# Patient Record
Sex: MALE | Race: BLACK OR AFRICAN AMERICAN | URBAN - METROPOLITAN AREA
[De-identification: names, ages, dates, MRNs, and addresses within clinical notes are randomized per-mention and may not be internally consistent; named-entity substitution may affect disease eponyms.]

---

## 2021-03-25 ENCOUNTER — OFFICE VISIT (OUTPATIENT)
Dept: URBAN - METROPOLITAN AREA CLINIC 40 | Facility: CLINIC | Age: 51
End: 2021-03-25

## 2021-04-13 ENCOUNTER — OFFICE VISIT (OUTPATIENT)
Dept: URBAN - METROPOLITAN AREA CLINIC 40 | Facility: CLINIC | Age: 51
End: 2021-04-13
Payer: COMMERCIAL

## 2021-04-13 ENCOUNTER — WEB ENCOUNTER (OUTPATIENT)
Dept: URBAN - METROPOLITAN AREA CLINIC 40 | Facility: CLINIC | Age: 51
End: 2021-04-13

## 2021-04-13 DIAGNOSIS — K62.5 RECTAL BLEEDING: ICD-10-CM

## 2021-04-13 PROCEDURE — 99203 OFFICE O/P NEW LOW 30 MIN: CPT | Performed by: PHYSICIAN ASSISTANT

## 2021-04-13 RX ORDER — SODIUM, POTASSIUM,MAG SULFATES 17.5-3.13G
ONCE SOLUTION, RECONSTITUTED, ORAL ORAL
Qty: 1 KIT | Refills: 0 | OUTPATIENT
Start: 2021-04-13 | End: 2021-04-14

## 2021-04-13 NOTE — HPI-TODAY'S VISIT:
Mr. Parks is a 51-year-old black male who presents to the office today for evaluation of rectal bleeding.  He admits in the last 6 months he has had 2 episodes of bright red blood per rectum without rectal pain. Last episode was about one month ago. Denies profuse rectal bleeding states blood typically would drip in the commode. No melena. He is a  and does admit to  sitting for prolonged periods of time as well as lifting.  No use of NSAIDs.  Rare alcohol, social.  Denies significant abdominal pain.  Reports normal consistency stool but may have been straining with bowel movements prior to episodes of rectal bleeding.  He is unsure.  Denies prior colon cancer screening.  No family history of colon cancer.  Good appetite.  Fair fiber in the diet and does drink water often.  No nausea, vomiting or dysphagia.  He is obese.  Hypertension, hyperlipidemia history.  Non-smoker.  Has no additional complaints.

## 2021-04-14 ENCOUNTER — ERX REFILL RESPONSE (OUTPATIENT)
Dept: URBAN - METROPOLITAN AREA CLINIC 40 | Facility: CLINIC | Age: 51
End: 2021-04-14

## 2021-04-14 RX ORDER — SODIUM SULFATE, POTASSIUM SULFATE, MAGNESIUM SULFATE 17.5; 3.13; 1.6 G/ML; G/ML; G/ML
ONCE SOLUTION, CONCENTRATE ORAL
Qty: 354 | Refills: 0 | OUTPATIENT

## 2021-04-14 RX ORDER — POLYETHYLENE GLYCOL 3350, SODIUM SULFATE ANHYDROUS, SODIUM BICARBONATE, SODIUM CHLORIDE, POTASSIUM CHLORIDE 236; 22.74; 6.74; 5.86; 2.97 G/4L; G/4L; G/4L; G/4L; G/4L
PLEASE SPECIFY DIRECTIONS, REFILLS AND QUANTITY POWDER, FOR SOLUTION ORAL
Qty: 1 MILLILITER | Refills: 0 | OUTPATIENT

## 2021-04-15 ENCOUNTER — TELEPHONE ENCOUNTER (OUTPATIENT)
Dept: URBAN - METROPOLITAN AREA CLINIC 40 | Facility: CLINIC | Age: 51
End: 2021-04-15

## 2021-04-15 RX ORDER — POLYETHYLENE GLYOCOL 3350, SODIUM CHLORIDE, SODIUM BICARBONATE AND POTASSIUM CHLORIDE 420; 11.2; 5.72; 1.48 G/4L; G/4L; G/4L; G/4L
420 GRAM POWDER, FOR SOLUTION NASOGASTRIC; ORAL ONCE
Qty: 1 | Refills: 0 | OUTPATIENT
Start: 2021-04-15 | End: 2021-04-16

## 2021-04-16 ENCOUNTER — TELEPHONE ENCOUNTER (OUTPATIENT)
Dept: URBAN - METROPOLITAN AREA CLINIC 40 | Facility: CLINIC | Age: 51
End: 2021-04-16

## 2021-04-16 RX ORDER — POLYETHYLENE GLYCOL 3350, SODIUM SULFATE ANHYDROUS, SODIUM BICARBONATE, SODIUM CHLORIDE, POTASSIUM CHLORIDE 236; 22.74; 6.74; 5.86; 2.97 G/4L; G/4L; G/4L; G/4L; G/4L
PLEASE SPECIFY DIRECTIONS, REFILLS AND QUANTITY POWDER, FOR SOLUTION ORAL
Qty: 1 MILLILITER | Refills: 0 | Status: ACTIVE | COMMUNITY

## 2021-04-16 RX ORDER — POLYETHYLENE GLYOCOL 3350, SODIUM CHLORIDE, SODIUM BICARBONATE AND POTASSIUM CHLORIDE 420; 11.2; 5.72; 1.48 G/4L; G/4L; G/4L; G/4L
420 GRAM POWDER, FOR SOLUTION NASOGASTRIC; ORAL ONCE
Qty: 1 | Refills: 0 | Status: ACTIVE | COMMUNITY
Start: 2021-04-15 | End: 2021-04-16

## 2021-04-16 RX ORDER — SODIUM, POTASSIUM,MAG SULFATES 17.5-3.13G
17.5-13.3-1.6 GM/177ML SOLUTION, RECONSTITUTED, ORAL ORAL
Qty: 354 MILILITER | Refills: 0 | OUTPATIENT
Start: 2021-04-16 | End: 2021-04-17

## 2021-04-19 ENCOUNTER — OFFICE VISIT (OUTPATIENT)
Dept: URBAN - METROPOLITAN AREA SURGERY CENTER 30 | Facility: SURGERY CENTER | Age: 51
End: 2021-04-19
Payer: COMMERCIAL

## 2021-04-19 ENCOUNTER — CLAIMS CREATED FROM THE CLAIM WINDOW (OUTPATIENT)
Dept: URBAN - METROPOLITAN AREA CLINIC 4 | Facility: CLINIC | Age: 51
End: 2021-04-19
Payer: COMMERCIAL

## 2021-04-19 DIAGNOSIS — D12.5 BENIGN NEOPLASM OF SIGMOID COLON: ICD-10-CM

## 2021-04-19 DIAGNOSIS — D12.5 ADENOMA OF SIGMOID COLON: ICD-10-CM

## 2021-04-19 DIAGNOSIS — K62.5 ANAL BLEEDING: ICD-10-CM

## 2021-04-19 DIAGNOSIS — K63.89 STENOSIS OF ILEOCECAL VALVE: ICD-10-CM

## 2021-04-19 DIAGNOSIS — K62.1 DYSPLASTIC POLYP OF RECTUM: ICD-10-CM

## 2021-04-19 PROCEDURE — G8907 PT DOC NO EVENTS ON DISCHARG: HCPCS | Performed by: INTERNAL MEDICINE

## 2021-04-19 PROCEDURE — 45385 COLONOSCOPY W/LESION REMOVAL: CPT | Performed by: INTERNAL MEDICINE

## 2021-04-19 PROCEDURE — 88305 TISSUE EXAM BY PATHOLOGIST: CPT | Performed by: PATHOLOGY

## 2021-04-30 ENCOUNTER — DASHBOARD ENCOUNTERS (OUTPATIENT)
Age: 51
End: 2021-04-30

## 2021-05-03 ENCOUNTER — OFFICE VISIT (OUTPATIENT)
Dept: URBAN - METROPOLITAN AREA TELEHEALTH 2 | Facility: TELEHEALTH | Age: 51
End: 2021-05-03
Payer: COMMERCIAL

## 2021-05-03 DIAGNOSIS — K63.5 HYPERPLASTIC COLON POLYPS: ICD-10-CM

## 2021-05-03 DIAGNOSIS — K62.5 RECTAL BLEEDING: ICD-10-CM

## 2021-05-03 DIAGNOSIS — K64.8 INTERNAL HEMORRHOIDS: ICD-10-CM

## 2021-05-03 DIAGNOSIS — K57.30 DIVERTICULA OF COLON: ICD-10-CM

## 2021-05-03 PROBLEM — 398050005 DIVERTICULAR DISEASE OF COLON: Status: ACTIVE | Noted: 2021-05-03

## 2021-05-03 PROCEDURE — 99212 OFFICE O/P EST SF 10 MIN: CPT | Performed by: INTERNAL MEDICINE

## 2021-05-03 RX ORDER — POLYETHYLENE GLYCOL 3350, SODIUM SULFATE ANHYDROUS, SODIUM BICARBONATE, SODIUM CHLORIDE, POTASSIUM CHLORIDE 236; 22.74; 6.74; 5.86; 2.97 G/4L; G/4L; G/4L; G/4L; G/4L
PLEASE SPECIFY DIRECTIONS, REFILLS AND QUANTITY POWDER, FOR SOLUTION ORAL
Qty: 1 MILLILITER | Refills: 0 | Status: DISCONTINUED | COMMUNITY

## 2021-05-03 NOTE — HPI-TODAY'S VISIT:
Mr. Parks is a 51-year-old black male who presents to the office today for evaluation of rectal bleeding.  He admits in the last 6 months he has had 2 episodes of bright red blood per rectum without rectal pain. Last episode was about one month ago. Denies profuse rectal bleeding states blood typically would drip in the commode. No melena. He is a  and does admit to  sitting for prolonged periods of time as well as lifting.  No use of NSAIDs.  Rare alcohol, social.  Denies significant abdominal pain.  Reports normal consistency stool but may have been straining with bowel movements prior to episodes of rectal bleeding.  He is unsure.  Denies prior colon cancer screening.  No family history of colon cancer.  Good appetite.  Fair fiber in the diet and does drink water often.  No nausea, vomiting or dysphagia.  He is obese.  Hypertension, hyperlipidemia history.  Non-smoker.  Has no additional complaints. Mr. Parks did complete a colonoscopy with Dr. Jara on April 19, 2021 where a total of 3 polyps removed from the colon less than 1 cm in size.  Two polyps of 6 and 8 mm removed from the sigmoid colon as well as a 4 mm polyp in the rectum.  Scattered medium mouth diverticula in the entire colon.  Internal hemorrhoids were noted on retroflexion.  Per path, polyps in sigmoid colon consistent with hyperplastic and sessile serrated lesion.  Hyperplastic polyp noted in the rectum.  It was recommended he have surveillance in 5 years time. He denies recurrent bleeding since colonoscopy. He has no new complaints.

## 2021-05-05 ENCOUNTER — TELEPHONE ENCOUNTER (OUTPATIENT)
Dept: URBAN - METROPOLITAN AREA CLINIC 40 | Facility: CLINIC | Age: 51
End: 2021-05-05

## 2021-06-11 ENCOUNTER — OFFICE VISIT (OUTPATIENT)
Dept: URBAN - METROPOLITAN AREA SURGERY CENTER 30 | Facility: SURGERY CENTER | Age: 51
End: 2021-06-11